# Patient Record
Sex: FEMALE | Race: WHITE
[De-identification: names, ages, dates, MRNs, and addresses within clinical notes are randomized per-mention and may not be internally consistent; named-entity substitution may affect disease eponyms.]

---

## 2017-01-01 ENCOUNTER — HOSPITAL ENCOUNTER (INPATIENT)
Dept: HOSPITAL 47 - 4FBP | Age: 27
LOS: 6 days | Discharge: HOME | End: 2017-01-07
Payer: COMMERCIAL

## 2017-01-01 VITALS — BODY MASS INDEX: 36.8 KG/M2

## 2017-01-01 DIAGNOSIS — J45.909: ICD-10-CM

## 2017-01-01 DIAGNOSIS — Z88.5: ICD-10-CM

## 2017-01-01 DIAGNOSIS — O48.0: Primary | ICD-10-CM

## 2017-01-01 DIAGNOSIS — Z79.899: ICD-10-CM

## 2017-01-01 DIAGNOSIS — Z3A.40: ICD-10-CM

## 2017-01-01 PROCEDURE — 85025 COMPLETE CBC W/AUTO DIFF WBC: CPT

## 2017-01-01 PROCEDURE — 88307 TISSUE EXAM BY PATHOLOGIST: CPT

## 2017-01-04 NOTE — P.HPOB
History of Present Illness


H&P Date: 17


Chief Complaint: Patient is here for postdates induction of labor.





This patient is a pleasant 26-year-old  1 para 0 female estimated date 

of confinement 2017 estimated gestational age 40-3/7 weeks gestation who 

presents for postdates induction of labor.  Patient's prenatal care has been 

uncomplicated.





Review of Systems


Constitutional: Denies chills, Denies fever


Ears, nose, mouth and throat: Denies headache, Denies sore throat


Cardiovascular: Denies chest pain, Denies shortness of breath


Respiratory: Denies cough


Gastrointestinal: Reports heartburn


Genitourinary: Reports pregnant


Menstruation: Reports amenorrhea


Musculoskeletal: Denies myalgias


Integumentary: Denies pruritus, Denies rash


Neurological: Denies numbness, Denies weakness


Psychiatric: Denies anxiety, Denies depression


Endocrine: Denies fatigue, Denies weight change





Past Medical History


Past Medical History: Asthma


History of Any Multi-Drug Resistant Organisms: None Reported


Past Surgical History: Tonsillectomy


Past Anesthesia/Blood Transfusion Reactions: No Reported Reaction


Past Psychological History: No Psychological Hx Reported


Smoking Status: Never smoker


Past Drug Use History: None Reported





- Past Family History


  ** Mother


Family Medical History: No Reported History





Medications and Allergies


 Home Medications











 Medication  Instructions  Recorded  Confirmed  Type


 


Albuterol Inhaler [Ventolin Hfa 2 inhalation .ROUTE ONCE PRN 16 

History





Inhaler]    











 Allergies











Allergy/AdvReac Type Severity Reaction Status Date / Time


 


hydrocodone [From Vicodin] Allergy Intermediate Anaphylaxis Verified 17 16

:33














Exam





- Vital Signs


Vital signs: 


 Vital Signs











  Temp Pulse Resp BP


 


 17 16:30  97.4 F L  85  16  132/85








 Intake and Output











 17





 06:59 14:59 22:59


 


Other:   


 


  Weight   97.522 kg








 Patient Weight











 17





 06:59


 


Weight 97.522 kg














- OBG Physical Exam


Abdomen: bowel sounds normal, no diffuse tenderness, no bruit present, no 

guarding noted, no hepatomegaly, no splenomegaly, no mass


Vulva: both: normal


Vagina: normal moisture, no discharge


Cervix: 





Cervix is closed and uneffaced.


Cervix: no lesion, no discharge


Uterus: enlarged (Fundal height is consistent with a term pregnancy.)





Results





Prenatal blood work shows she is AB+, rubella immune, RPR nonreactive, 

hepatitis B negative, HIV nonreactive, group B strep was negative, ultrasounds 

have been normal, Glucola was abnormal with a normal three-hour gtt.





Assessment and Plan


(1) Post-dates pregnancy


Narrative/Plan: 


This is a pleasant 26-year-old  1 para 0 female 40-3/7 weeks' gestation 

requesting postdates induction of labor.  Patient's cervix is unfavorable 

therefore we are going to proceed with Cervidil induction of labor.  Patient 

does understand the increased risk of  section due to the unfavorable 

cervix.


Status: Acute

## 2017-01-05 LAB
BASOPHILS # BLD AUTO: 0 K/UL (ref 0–0.2)
BASOPHILS NFR BLD AUTO: 0 %
CH: 31
CHCM: 34.8
EOSINOPHIL # BLD AUTO: 0.1 K/UL (ref 0–0.7)
EOSINOPHIL NFR BLD AUTO: 1 %
ERYTHROCYTE [DISTWIDTH] IN BLOOD BY AUTOMATED COUNT: 4.04 M/UL (ref 3.8–5.4)
ERYTHROCYTE [DISTWIDTH] IN BLOOD: 14.4 % (ref 11.5–15.5)
HCT VFR BLD AUTO: 36.1 % (ref 34–46)
HDW: 3
HGB BLD-MCNC: 12.1 GM/DL (ref 11.4–16)
LUC NFR BLD AUTO: 3 %
LYMPHOCYTES # SPEC AUTO: 1.7 K/UL (ref 1–4.8)
LYMPHOCYTES NFR SPEC AUTO: 16 %
MCH RBC QN AUTO: 30.1 PG (ref 25–35)
MCHC RBC AUTO-ENTMCNC: 33.6 G/DL (ref 31–37)
MCV RBC AUTO: 89.5 FL (ref 80–100)
MONOCYTES # BLD AUTO: 0.5 K/UL (ref 0–1)
MONOCYTES NFR BLD AUTO: 5 %
NEUTROPHILS # BLD AUTO: 7.6 K/UL (ref 1.3–7.7)
NEUTROPHILS NFR BLD AUTO: 75 %
WBC # BLD AUTO: 0.29 10*3/UL
WBC # BLD AUTO: 10.1 K/UL (ref 3.8–10.6)
WBC (PEROX): 11.01

## 2017-01-05 RX ADMIN — OXYTOCIN-SODIUM CHLORIDE 0.9% IV SOLN 30 UNIT/500ML SCH: 30-0.9/5 SOLUTION at 23:11

## 2017-01-05 RX ADMIN — POTASSIUM CHLORIDE SCH MLS/HR: 14.9 INJECTION, SOLUTION INTRAVENOUS at 09:28

## 2017-01-05 RX ADMIN — POTASSIUM CHLORIDE SCH MLS/HR: 14.9 INJECTION, SOLUTION INTRAVENOUS at 14:12

## 2017-01-05 RX ADMIN — POTASSIUM CHLORIDE SCH MLS/HR: 14.9 INJECTION, SOLUTION INTRAVENOUS at 05:30

## 2017-01-05 NOTE — P.OP
Date of Procedure: 17


Preoperative Diagnosis: 


#1: 40-4/7 week intrauterine pregnancy.  #2: Cephalopelvic dystocia.


Postoperative Diagnosis: 


Same


Procedure(s) Performed: 


Primary low transverse  section.


Anesthesia: epidural


Surgeon: Collin Houser


Assistant #1: Badlemar Carpenter


Estimated Blood Loss (ml): 800


Pathology: other


Condition: stable


Disposition: floor


Indications for Procedure: 


Please see dictated H&P for intimate details of this patient's admission.  

Brief summary is a pleasant 26-year-old  1 para 0 female 40-4/7 weeks 

gestation is admitted last evening for Cervidil placement secondary to 

postdates pregnancy.  Patient is admitted has a Cervidil placed and this 

morning is 2 cm dilated.  Patient has artificial rupture membranes for clear 

fluid gets to throughout the day approximately 7-8 cm dilated but gets caput 

and no descent past -1 station.  At this time we recommend proceeding with 

 section and patient agrees.  Patient does understand the surgery and 

risks including risks of infection, bleeding, possible injury to bowel, bladder

, vessels, and/or other organs.  Patient stands risk of DVT and pulmonary 

embolism.  All the patient's questions are answered written consent is obtained.


Operative Findings: 


This is a vigorous viable female infant Apgars are 8 and 9 and delivery time is 

2001 hrs. Infant was occiput transverse/occiput posterior.  Fetal weight was 8 

lbs. 8 oz


Description of Procedure: 


This patient has a Oleary catheter placed to straight drain.  She subsequently 

taken to the operating room where her epidural is dosed up for sufficient level 

of surgery.  With this done she has an abdominal prep and drape.  Scalpel is 

then taken and a Pfannenstiel skin incision is then made.  A second scalpel is 

then taken down the fascia and the fascia scored with a knife.  Fascial 

incision extended bilaterally using the Whyte scissors.  Fascia is dissected off 

the rectus muscles sharply.  Rectus muscles are  the peritoneum 

identified and entered sharply.  Peritoneal incision extended superiorly and 

inferiorly without difficulty.  Bladder blade is then placed.  Bladder 

peritoneum was then taken sharply off the lower uterine segment with Metzenbaum 

scissors.  Scalpels taken a low transverse uterine incision is then made.  

Using a hemostat I enter the uterine cavity bluntly and there is loss of clear 

fluid.  Infant's head is found to be occiput posterior/ transverse.  Infant's 

head is guided through the incision and mouth and nares are bulb suctioned.  We 

then have delivery the anterior and posterior shoulder and rest this infant's 

body.  This is a vigorous viable female infant Apgars are 8 and 9 delivery time 

is 2001 hours.  After delivery of the infant the umbilical cords doubly clamped 

and cut and appears to be trivascular.  The placenta is then manually extracted 

intact.  Uterus is then externalized and uterine incision demarcated with 

Funez clamps.  Uterine incision then closed using 0 Vicryl running locked 

fashion 2 layers.  Good hemostasis is noted.  Bladder peritoneum was then 

closed using a 3-0 Vicryl running fashion.  Excess fluid is removed from the 

abdomen and pelvis.  Uterus tubes and ovaries appear normal for term gestation.

  Uterus placed back into the abdomen.  Parietal peritoneum was then closed 

using 0 Vicryl running fashion.  Rectus muscles are reapproximated in 0 Vicryl 

interrupted fashion.  Fascia is then closed using 0 PDS.  Fascial incision is 

intact and hemostatic.  Subcutaneous tissues and closed using a 3-0 Vicryl.  

Skin is and closed using staples.  Counts are correct 3.  There are no 

complications.  Infant and mother are taken together birthing suite in 

satisfactory condition.

## 2017-01-06 LAB
BASOPHILS # BLD AUTO: 0 K/UL (ref 0–0.2)
BASOPHILS NFR BLD AUTO: 0 %
CH: 30.8
CHCM: 33.9
EOSINOPHIL # BLD AUTO: 0 K/UL (ref 0–0.7)
EOSINOPHIL NFR BLD AUTO: 0 %
ERYTHROCYTE [DISTWIDTH] IN BLOOD BY AUTOMATED COUNT: 3.09 M/UL (ref 3.8–5.4)
ERYTHROCYTE [DISTWIDTH] IN BLOOD: 14.4 % (ref 11.5–15.5)
HCT VFR BLD AUTO: 28.2 % (ref 34–46)
HDW: 2.91
HGB BLD-MCNC: 9.5 GM/DL (ref 11.4–16)
LUC NFR BLD AUTO: 2 %
LYMPHOCYTES # SPEC AUTO: 1.1 K/UL (ref 1–4.8)
LYMPHOCYTES NFR SPEC AUTO: 10 %
MCH RBC QN AUTO: 30.7 PG (ref 25–35)
MCHC RBC AUTO-ENTMCNC: 33.6 G/DL (ref 31–37)
MCV RBC AUTO: 91.3 FL (ref 80–100)
MONOCYTES # BLD AUTO: 0.6 K/UL (ref 0–1)
MONOCYTES NFR BLD AUTO: 5 %
NEUTROPHILS # BLD AUTO: 9 K/UL (ref 1.3–7.7)
NEUTROPHILS NFR BLD AUTO: 83 %
WBC # BLD AUTO: 0.18 10*3/UL
WBC # BLD AUTO: 10.8 K/UL (ref 3.8–10.6)
WBC (PEROX): 11.69

## 2017-01-06 RX ADMIN — KETOROLAC TROMETHAMINE PRN MG: 30 INJECTION, SOLUTION INTRAMUSCULAR at 18:52

## 2017-01-06 RX ADMIN — KETOROLAC TROMETHAMINE PRN MG: 30 INJECTION, SOLUTION INTRAMUSCULAR at 11:49

## 2017-01-06 RX ADMIN — ACETAMINOPHEN AND CODEINE PHOSPHATE PRN EACH: 300; 30 TABLET ORAL at 15:20

## 2017-01-06 RX ADMIN — DOCUSATE SODIUM AND SENNOSIDES SCH: 50; 8.6 TABLET ORAL at 08:51

## 2017-01-06 RX ADMIN — KETOROLAC TROMETHAMINE PRN MG: 30 INJECTION, SOLUTION INTRAMUSCULAR at 05:23

## 2017-01-06 RX ADMIN — POTASSIUM CHLORIDE SCH: 14.9 INJECTION, SOLUTION INTRAVENOUS at 15:22

## 2017-01-06 RX ADMIN — OXYTOCIN-SODIUM CHLORIDE 0.9% IV SOLN 30 UNIT/500ML SCH: 30-0.9/5 SOLUTION at 02:24

## 2017-01-06 RX ADMIN — POTASSIUM CHLORIDE SCH MLS/HR: 14.9 INJECTION, SOLUTION INTRAVENOUS at 00:49

## 2017-01-06 RX ADMIN — POTASSIUM CHLORIDE SCH: 14.9 INJECTION, SOLUTION INTRAVENOUS at 06:55

## 2017-01-06 RX ADMIN — DOCUSATE SODIUM AND SENNOSIDES SCH EACH: 50; 8.6 TABLET ORAL at 22:58

## 2017-01-06 NOTE — P.PNOBGPC
Subjective





- Subjective


Patient reports: Reports appetite normal, Reports voiding normally, Reports 

pain well controlled, Reports ambulating normally


[place]: doing well





Objective





- Vital Signs


Latest vital signs: 


 Vital Signs











  Temp Pulse Resp BP Pulse Ox


 


 17 00:00  97.4 F L  94  16  135/76  98


 


 17 22:30  96.2 F L  78  16  118/65  97


 


 17 22:00  97.6 F  96  16  139/77  97


 


 17 21:30  96.2 F L  108 H  16  157/70  97


 


 17 21:15   105 H  16  156/70  97


 


 17 21:00  97.7 F  112 H  16  144/63  97


 


 17 20:45  98.0 F  98  16  128/66  96


 


 17 20:38      96


 


 17 20:30  97.7 F  101 H  16  125/84  97








 Intake and Output











 17





 14:59 22:59 06:59


 


Intake Total 2000 2950 


 


Output Total  1300 800


 


Balance 2000 1650 -800


 


Intake:   


 


  IV 2000 1650 


 


    Lactated Ringers 1,000 ml 2000 300 





    @ 125 mls/hr IV .Q8H SURENDRA   





    Rx#:[id number]   


 


  Intake, IV Titration  100 





  Amount   


 


    ceFAZolin 2 gm In Sodium  100 





    Chloride 0.9% 100 ml @   





    100 mls/hr IVPB Q8HR SURENDRA   





    Rx#:[id number]   


 


  Oral  1200 


 


Output:   


 


  Urine  500 


 


  Estimated Blood Loss  800 800


 


Other:   


 


  Weight  97.522 kg 








 Patient Weight











 17





 06:59


 


Weight 97.522 kg














- Exam


Lungs: bilateral: normal


Chest: Normal S1, Normal S2


Extremities: Present: normal


Abdomen: Present: normal appearance, soft.  Absent: distention, tenderness


Incision: Present: normal, dry, intact


Uterus: Present: normal, firm





Assessment and Plan


(1) Post-dates pregnancy


Narrative/Plan: 


Postoperative day #1.  Patient is resting without complaints.  Vital signs are 

stable she is afebrile.  Uterus is firm nontender she's having normal lochia.  

Plan today is to check a CBC, encouraged patient to ambulate, advance her diet.

  Continue routine postoperative care


Current Visit: Yes   Status: Acute   Code(s): O48.0 - POST-TERM PREGNANCY   

SNOMED Code(s): 82129322


   





(2)  delivery delivered


Current Visit: Yes   Status: Acute   Code(s): O82 - ENCOUNTER FOR  

DELIVERY WITHOUT INDICATION   SNOMED Code(s): 844769146

## 2017-01-07 VITALS
DIASTOLIC BLOOD PRESSURE: 70 MMHG | TEMPERATURE: 98.3 F | SYSTOLIC BLOOD PRESSURE: 136 MMHG | HEART RATE: 88 BPM | RESPIRATION RATE: 16 BRPM

## 2017-01-07 RX ADMIN — ACETAMINOPHEN AND CODEINE PHOSPHATE PRN EACH: 300; 30 TABLET ORAL at 08:12

## 2017-01-07 RX ADMIN — ACETAMINOPHEN AND CODEINE PHOSPHATE PRN EACH: 300; 30 TABLET ORAL at 02:03

## 2017-01-07 RX ADMIN — POTASSIUM CHLORIDE SCH: 14.9 INJECTION, SOLUTION INTRAVENOUS at 09:54

## 2017-01-07 RX ADMIN — DOCUSATE SODIUM AND SENNOSIDES SCH EACH: 50; 8.6 TABLET ORAL at 08:12

## 2017-01-07 NOTE — P.DS
Providers


Date of admission: 


17 16:21





Expected date of discharge: 17


Attending physician: 


Collin Houser





Primary care physician: 


Chao Ku








- Discharge Diagnosis(es)


(1) Post-dates pregnancy


Current Visit: Yes   Status: Acute   





(2)  delivery delivered


Current Visit: Yes   Status: Acute   


Hospital Course: 





Please see dictated H&P for intimate details of this patient's admission.  

Brief summary is a pleasant 26-year-old  1 para 0 female 40-4/7 weeks 

gestation admitted for two-stage induction of labor subsequent was on have a 

primary  section for cephalopelvic dystocia.  Please see dictated 

delivery note/operative note.  Postoperative patient does well and on 

postoperative 2 is felt be stable for discharge home follow up with me in 1 

week for an incision check.


Procedures: 





Induction of labor and primary low transverse  section.


Patient Condition at Discharge: Good





Plan - Discharge Summary


New Discharge Prescriptions: 


Acetaminophen-Codeine 300-30mg [Tylenol w/codeine #3] 1 - 2 each PO Q4HR PRN #

40 tab


 PRN Reason: Pain


Ibuprofen [Motrin] 600 mg PO Q6HR PRN #40 tab


 PRN Reason: Mild Pain Or Fever >= 100.5


Discharge Medication List





Albuterol Inhaler [Ventolin Hfa Inhaler] 2 inhalation .ROUTE ONCE PRN 16 [

History]


Acetaminophen-Codeine 300-30mg [Tylenol w/codeine #3] 1 - 2 each PO Q4HR PRN #

40 tab 17 [Rx]


Ibuprofen [Motrin] 600 mg PO Q6HR PRN #40 tab 17 [Rx]








Follow up Appointment(s)/Referral(s): 


Collin Houser MD [STAFF PHYSICIAN] - 17 8:30 am (Patient also has a 6 

weeks' postpartum check on February 15 9:30 AM.)


Patient Instructions/Handouts:   (DC)


Activity/Diet/Wound Care/Special Instructions: 


No strenuous activity or heavy lifting for 6 weeks.  Please call if any fever, 

chills, excessive vaginal bleeding, and/or abdominal pain.  No intercourse for 

6 weeks.


Discharge Disposition: HOME SELF-CARE

## 2017-01-07 NOTE — P.PNOBGPC
Subjective





- Subjective


Patient reports: Reports appetite normal, Reports voiding normally, Reports 

pain well controlled, Reports ambulating normally


: doing well





Objective





- Vital Signs


Latest vital signs: 


 Vital Signs











  Temp Pulse Resp BP Pulse Ox


 


 17 00:30  98.6 F  85  11 L  107/59 


 


 17 20:00  98.1 F  100  10 L  113/64 


 


 17 16:00  98.3 F  102 H  16  122/70 


 


 17 12:00  97.9 F  98  18  121/68  98


 


 17 08:00  97.5 F L  86  16  119/74 














- Exam


Lungs: bilateral: normal


Chest: Normal S1, Normal S2


Extremities: Present: normal


Abdomen: Present: normal appearance, soft.  Absent: distention, tenderness


Incision: Present: normal, dry, intact


Uterus: Present: normal, firm





- Labs


Labs: 


 Abnormal Lab Results - Last 24 Hours (Table)











  17 Range/Units





  07:46 


 


WBC  10.8 H  (3.8-10.6)  k/uL


 


RBC  3.09 L  (3.80-5.40)  m/uL


 


Hgb  9.5 L D  (11.4-16.0)  gm/dL


 


Hct  28.2 L  (34.0-46.0)  %


 


Plt Count  146 L  (150-450)  k/uL


 


Neutrophils #  9.0 H  (1.3-7.7)  k/uL














Assessment and Plan


(1) Post-dates pregnancy


Narrative/Plan: 


Postoperative day #2.  Patient is resting without new complaints.  Vital signs 

are stable she is afebrile.  She's tolerating regular diet, ambulating, 

urinating without difficulty.  Patient is considering going home later today.  

My impression is that this is a normal postoperative course, and she is stable 

to go home today if she wishes.


Current Visit: Yes   Status: Acute   Code(s): O48.0 - POST-TERM PREGNANCY   

SNOMED Code(s): 62472329


   





(2)  delivery delivered


Current Visit: Yes   Status: Acute   Code(s): O82 - ENCOUNTER FOR  

DELIVERY WITHOUT INDICATION   SNOMED Code(s): 786394204

## 2018-06-06 NOTE — P.HPOB
History of Present Illness


H&P Date: 18


Chief Complaint: Repeat C/S





This patient is a pleasant 27 yr  female EDC 2018 estimated gestational 

age 39 6/7 weeks who presents for elective repeat C/S. Pregnancy has been 

complicated by polyhydraminos (32cm) of unknown etiology that was diagnosed at 

35wks.  She has been watched with NSTs.  She also was late to seek care, but 

this was due to insurance issues.   





Review of Systems


Gastrointestinal: Reports heartburn


Genitourinary: Reports pregnant


Menstruation: Reports amenorrhea





Past Medical History


Past Medical History: Asthma


History of Any Multi-Drug Resistant Organisms: None Reported


Past Surgical History:  Section, Tonsillectomy


Past Anesthesia/Blood Transfusion Reactions: No Reported Reaction


Past Psychological History: No Psychological Hx Reported


Smoking Status: Never smoker


Past Alcohol Use History: None Reported


Past Drug Use History: None Reported





- Past Family History


  ** Mother


Family Medical History: No Reported History





Medications and Allergies


 Home Medications











 Medication  Instructions  Recorded  Confirmed  Type


 


Albuterol Inhaler [Ventolin Hfa 2 inhalation .ROUTE ONCE PRN 16 

History





Inhaler]    


 


Acetaminophen-Codeine 300-30mg 1 - 2 each PO Q4HR PRN #40 tab 17  Rx





[Tylenol w/codeine #3]    


 


Ibuprofen [Motrin] 600 mg PO Q6HR PRN #40 tab 17  Rx











 Allergies











Allergy/AdvReac Type Severity Reaction Status Date / Time


 


hydrocodone [From Vicodin] Allergy Intermediate Anaphylaxis Verified 17 16

:33














Exam





- OBG Physical Exam


Abdomen: bowel sounds normal, no diffuse tenderness, no bruit present, no 

guarding noted, no hepatomegaly, no splenomegaly, no mass


Vulva: both: normal


Vagina: normal moisture, no discharge


Cervix: no lesion, no discharge


Uterus: enlarged (Fundal height is 45cm)





Results





Prenatal labs:  AB positive, Rubella Immune, GYP-DHS-EmqC negative, Glucola 158 

(normal 3hr GTT  84, 178, 116, 122), GBS was positive.  Ultrasound shows 

polyhydraminos with EMILY 30-32 and LGA.





Assessment and Plan


Assessment: 





This is a pleasant 27 yr old  female 39 and 6/7 weeks gestation who 

presents for elective repeat cesearan section.  She and I have discussed this 

surgery and risks:  infection, bleeding, possible injury to bowel/bladder/

vessels and/or other organs.  She understands the risks of DVT/PE.  All of the 

patients questions were answered and a written consent was obtained.  


(1) Previous  delivery affecting pregnancy


Status: Acute   Code(s): O34.219 - MATERNAL CARE FOR UNSP TYPE SCAR FROM 

PREVIOUS  DEL   SNOMED Code(s): 401413677


   





(2) Polyhydramnios affecting pregnancy


Status: Acute   Code(s): O40.9XX0 - POLYHYDRAMNIOS, UNSP TRIMESTER, NOT 

APPLICABLE OR UNSP   SNOMED Code(s): 08639696


   





(3) Group B streptococcal carriage complicating pregnancy


Status: Acute   Code(s): O99.820 - STREPTOCOCCUS B CARRIER STATE COMPLICATING 

PREGNANCY   SNOMED Code(s): 411351969431737

## 2018-06-07 ENCOUNTER — HOSPITAL ENCOUNTER (INPATIENT)
Dept: HOSPITAL 47 - 4FBP | Age: 28
LOS: 2 days | Discharge: HOME | End: 2018-06-09
Payer: COMMERCIAL

## 2018-06-07 VITALS — BODY MASS INDEX: 39.4 KG/M2

## 2018-06-07 DIAGNOSIS — O40.3XX0: ICD-10-CM

## 2018-06-07 DIAGNOSIS — Z79.899: ICD-10-CM

## 2018-06-07 DIAGNOSIS — Z88.5: ICD-10-CM

## 2018-06-07 DIAGNOSIS — Z3A.39: ICD-10-CM

## 2018-06-07 DIAGNOSIS — O34.211: Primary | ICD-10-CM

## 2018-06-07 DIAGNOSIS — J45.909: ICD-10-CM

## 2018-06-07 LAB
BASOPHILS # BLD AUTO: 0 K/UL (ref 0–0.2)
BASOPHILS NFR BLD AUTO: 0 %
EOSINOPHIL # BLD AUTO: 0.1 K/UL (ref 0–0.7)
EOSINOPHIL NFR BLD AUTO: 1 %
ERYTHROCYTE [DISTWIDTH] IN BLOOD BY AUTOMATED COUNT: 3.93 M/UL (ref 3.8–5.4)
ERYTHROCYTE [DISTWIDTH] IN BLOOD: 14.8 % (ref 11.5–15.5)
HCT VFR BLD AUTO: 33 % (ref 34–46)
HGB BLD-MCNC: 11 GM/DL (ref 11.4–16)
LYMPHOCYTES # SPEC AUTO: 1.7 K/UL (ref 1–4.8)
LYMPHOCYTES NFR SPEC AUTO: 21 %
MCH RBC QN AUTO: 27.9 PG (ref 25–35)
MCHC RBC AUTO-ENTMCNC: 33.3 G/DL (ref 31–37)
MCV RBC AUTO: 83.8 FL (ref 80–100)
MONOCYTES # BLD AUTO: 0.7 K/UL (ref 0–1)
MONOCYTES NFR BLD AUTO: 8 %
NEUTROPHILS # BLD AUTO: 5.5 K/UL (ref 1.3–7.7)
NEUTROPHILS NFR BLD AUTO: 68 %
PLATELET # BLD AUTO: 224 K/UL (ref 150–450)
WBC # BLD AUTO: 8.1 K/UL (ref 3.8–10.6)

## 2018-06-07 PROCEDURE — 88307 TISSUE EXAM BY PATHOLOGIST: CPT

## 2018-06-07 PROCEDURE — 86900 BLOOD TYPING SEROLOGIC ABO: CPT

## 2018-06-07 PROCEDURE — 86850 RBC ANTIBODY SCREEN: CPT

## 2018-06-07 PROCEDURE — 86901 BLOOD TYPING SEROLOGIC RH(D): CPT

## 2018-06-07 PROCEDURE — 85025 COMPLETE CBC W/AUTO DIFF WBC: CPT

## 2018-06-07 RX ADMIN — IBUPROFEN PRN MG: 600 TABLET ORAL at 20:36

## 2018-06-07 RX ADMIN — POTASSIUM CHLORIDE SCH MLS/HR: 14.9 INJECTION, SOLUTION INTRAVENOUS at 17:07

## 2018-06-07 NOTE — P.OP
Date of Procedure: 18


Preoperative Diagnosis: 


#1: 39-6/7 weeks pregnancy.  #2: Previous  section desires repeat.  #3: 

Polyhydramnios


Postoperative Diagnosis: 


Same


Procedure(s) Performed: 


Repeat low transverse  section.


Anesthesia: spinal


Surgeon: Collin Houser


Assistant #1: Nicole Prasad


Estimated Blood Loss (ml): 800


Pathology: other (Placenta)


Condition: stable


Disposition: floor


Indications for Procedure: 


Please see dictated H&P for intimate details of this patient's admission.  

Brief summary this pleasant 27-year-old  2 para 1 female 39-6/7 weeks 

gestation admitted to labor and delivery for elective repeat  section.  

Patient I discussed the surgery and risks including risks of infection, bleeding

, possible injury bowel, bladder, vessels, and/or other organs.  Patient 

understands risk of DVT and pulmonary embolism.  All the patient's questions 

are answered and a written consent is obtained.


Operative Findings: 


This is a vigorous viable female infant Apgars 9 and 9 delivery time is 0807 

hrs.  Infant has spontaneous respiration and good cry and grossly appeared 

normal.


Description of Procedure: 


This patient had a Oleary catheter placed to straight drain.  She is 

subsequently taken to the operating room where she is sat up and spinal 

anesthetic is administered without incident.  With an adequate level of 

anesthesia she has abdominal prep and drape.  Scalpels then taken in the 

previous Pfannenstiel incision is excised.  A second scalpel is taken down the 

fascia the fascia scored with a knife.  Fascial incision extended bilaterally 

using the Whyte scissors.  Rectus muscles are then  peritoneum 

identified and entered sharply.  Peritoneal incision extended superior and 

inferior without difficulty.  Bladder blade is then placed.  Bladder peritoneum 

was dissected off the lower uterine segment.  Scalpels and taken low transverse 

uterine incision is then made.  Using a hemostat I into the uterine cavity and 

there is loss of a large amount of clear fluid consistent with polyhydramnios.  

The infant's head is then guided into the incision with fundal pressure 

delivered.  Mouth and nares are bulb suctioned.  There is no evidence of a 

nuchal cord.  Then have delivery anterior and posterior shoulder and rest this 

infant's body.  This is a vigorous viable female infant.  Apgars are 9 and 9 

delivery time is 0807 hrs.  After delivery of the infant the umbilical cords 

doubly clamped and cut.  Infant is then handed off to the nurses in attendance.

  Placenta is then manually extracted intact.  Uterus is then externalized and 

uterine margins demarcated with Funez clamps.  Incision is then closed 

using 0 Vicryl running locked fashion 2 layers.  Excellent hemostasis is noted.

  Excess fluid is then removed from the abdomen and pelvis.  Uterus placed back 

into the abdomen.  Final inspection shows good hemostasis.  The parietal 

peritoneum was then identified with hemostats and closed using 0 Vicryl running 

fashion.  Rectus muscles reapproximated using 0 Vicryl interrupted fashion.  

Fascia is then closed using 0 PDS in a running fashion.  Fascial incision is 

intact and hemostatic.  Subcutaneous tissues and closed using a 3-0 Vicryl.  

Staples and a sterile dressing is then applied.  All counts are correct 3.  

There are no complications.  Infant and mother are stable birthing room.

## 2018-06-08 VITALS — RESPIRATION RATE: 16 BRPM

## 2018-06-08 LAB
BASOPHILS # BLD AUTO: 0 K/UL (ref 0–0.2)
BASOPHILS NFR BLD AUTO: 0 %
EOSINOPHIL # BLD AUTO: 0 K/UL (ref 0–0.7)
EOSINOPHIL NFR BLD AUTO: 1 %
ERYTHROCYTE [DISTWIDTH] IN BLOOD BY AUTOMATED COUNT: 2.97 M/UL (ref 3.8–5.4)
ERYTHROCYTE [DISTWIDTH] IN BLOOD: 15 % (ref 11.5–15.5)
HCT VFR BLD AUTO: 25.1 % (ref 34–46)
HGB BLD-MCNC: 8.5 GM/DL (ref 11.4–16)
LYMPHOCYTES # SPEC AUTO: 1 K/UL (ref 1–4.8)
LYMPHOCYTES NFR SPEC AUTO: 11 %
MCH RBC QN AUTO: 28.5 PG (ref 25–35)
MCHC RBC AUTO-ENTMCNC: 33.6 G/DL (ref 31–37)
MCV RBC AUTO: 84.7 FL (ref 80–100)
MONOCYTES # BLD AUTO: 0.6 K/UL (ref 0–1)
MONOCYTES NFR BLD AUTO: 6 %
NEUTROPHILS # BLD AUTO: 7.6 K/UL (ref 1.3–7.7)
NEUTROPHILS NFR BLD AUTO: 81 %
PLATELET # BLD AUTO: 172 K/UL (ref 150–450)
WBC # BLD AUTO: 9.4 K/UL (ref 3.8–10.6)

## 2018-06-08 RX ADMIN — DOCUSATE SODIUM AND SENNOSIDES SCH: 50; 8.6 TABLET ORAL at 00:28

## 2018-06-08 RX ADMIN — ACETAMINOPHEN PRN MG: 325 TABLET, FILM COATED ORAL at 16:13

## 2018-06-08 RX ADMIN — IBUPROFEN PRN MG: 600 TABLET ORAL at 11:25

## 2018-06-08 RX ADMIN — IBUPROFEN PRN MG: 600 TABLET ORAL at 20:54

## 2018-06-08 RX ADMIN — DOCUSATE SODIUM AND SENNOSIDES SCH: 50; 8.6 TABLET ORAL at 22:03

## 2018-06-08 RX ADMIN — DOCUSATE SODIUM AND SENNOSIDES SCH: 50; 8.6 TABLET ORAL at 07:01

## 2018-06-08 RX ADMIN — POTASSIUM CHLORIDE SCH: 14.9 INJECTION, SOLUTION INTRAVENOUS at 00:28

## 2018-06-08 RX ADMIN — POTASSIUM CHLORIDE SCH: 14.9 INJECTION, SOLUTION INTRAVENOUS at 06:01

## 2018-06-08 RX ADMIN — IBUPROFEN PRN MG: 600 TABLET ORAL at 04:18

## 2018-06-08 RX ADMIN — DOCUSATE SODIUM AND SENNOSIDES SCH EACH: 50; 8.6 TABLET ORAL at 22:03

## 2018-06-08 NOTE — P.PNOBGPC
Subjective





- Subjective


Patient reports: Reports appetite normal, Reports voiding normally, Reports 

pain well controlled, Reports ambulating normally


[place]: doing well





Objective





- Vital Signs


Latest vital signs: 


 Vital Signs











  Temp Pulse Resp BP Pulse Ox


 


 18 05:00    12  


 


 18 04:00  98.5 F  70  14  115/50 


 


 18 03:00    14  


 


 18 01:00    16  


 


 18 23:58  98.3 F  90  16  124/60  98


 


 18 21:00    16  


 


 18 20:00  97.8 F  86  16  130/74  99


 


 18 18:28    16  


 


 18 17:00    16   98


 


 18 16:00  97.7 F  80  16  120/75 


 


 18 15:00    16  


 


 18 13:11      98


 


 18 12:50    16  


 


 18 12:00  98.4 F  84  16  120/69  98


 


 18 11:11    16   98


 


 18 10:36   68  16  112/64  98


 


 18 10:06   64  16  117/58  98


 


 18 09:36   61  16  91/52  100


 


 18 09:21   61  16  100/46  96


 


 18 09:06   58 L  16  103/46  100


 


 18 08:51   79  16  104/57  98


 


 18 08:36  97.0 F L  86  16  113/53  98








 Intake and Output











 18





 14:59 22:59 06:59


 


Intake Total 240  100


 


Output Total 1400 2200 800


 


Balance -1160 -2200 -700


 


Intake:   


 


  Oral 240  100


 


Output:   


 


  Urine 600 2200 800


 


  Estimated Blood Loss 800  


 


Other:   


 


  Voiding Method  Toilet 


 


  # Voids 1 1 1














- Exam


Lungs: bilateral: normal


Chest: Normal S1, Normal S2


Extremities: Present: normal


Abdomen: Present: normal appearance, soft.  Absent: distention, tenderness


Incision: Present: normal, dry, intact


Uterus: Present: normal, firm





- Labs


Labs: 


 Abnormal Lab Results - Last 24 Hours (Table)











  18 Range/Units





  06:00 


 


Hgb  11.0 L  (11.4-16.0)  gm/dL


 


Hct  33.0 L  (34.0-46.0)  %














Assessment and Plan


Assessment: 





Post operative day #1.  Patient is resting without complaints.  Vital signs are 

stable and she is afebrile.  Uterus is firm nontender and her incision is 

intact and dry.  CBC is pending at this time.  My impression this is a normal 

postoperative course.  Plan is to continue routine postoperative care, check a 

CBC, continue regular diet, and allow the patient to shower.  Most likely 

patient will go home tomorrow.


(1) Previous  delivery affecting pregnancy


Current Visit: Yes   Status: Acute   Code(s): O34.219 - MATERNAL CARE FOR UNSP 

TYPE SCAR FROM PREVIOUS  DEL   SNOMED Code(s): 621069284


   





(2) Polyhydramnios affecting pregnancy


Current Visit: Yes   Status: Acute   Code(s): O40.9XX0 - POLYHYDRAMNIOS, UNSP 

TRIMESTER, NOT APPLICABLE OR UNSP   SNOMED Code(s): 39224719


   





(3) Group B streptococcal carriage complicating pregnancy


Current Visit: Yes   Status: Acute   Code(s): O99.820 - STREPTOCOCCUS B CARRIER 

STATE COMPLICATING PREGNANCY   SNOMED Code(s): 504839184285517

## 2018-06-08 NOTE — P.PN
Progress Note - Text


Progress Note Date: 18


28 yo female status post . Post-op day #1. Patient received 

intrathecal Duramorph. Patient was seen today, sitting up in bed no complaints, 

pain VAS score 0/10, no headache, no itching, no nausea and vomiting.


Assessment and plan: Doing well in general no complications from anesthesia.

## 2018-06-09 VITALS — SYSTOLIC BLOOD PRESSURE: 114 MMHG | HEART RATE: 78 BPM | DIASTOLIC BLOOD PRESSURE: 62 MMHG | TEMPERATURE: 98.6 F

## 2018-06-09 RX ADMIN — IBUPROFEN PRN MG: 600 TABLET ORAL at 04:18

## 2018-06-09 RX ADMIN — POTASSIUM CHLORIDE SCH: 14.9 INJECTION, SOLUTION INTRAVENOUS at 01:48

## 2018-06-09 RX ADMIN — ACETAMINOPHEN PRN MG: 325 TABLET, FILM COATED ORAL at 08:30

## 2018-06-09 RX ADMIN — DOCUSATE SODIUM AND SENNOSIDES SCH EACH: 50; 8.6 TABLET ORAL at 08:30
